# Patient Record
Sex: FEMALE | Race: WHITE | NOT HISPANIC OR LATINO | URBAN - METROPOLITAN AREA
[De-identification: names, ages, dates, MRNs, and addresses within clinical notes are randomized per-mention and may not be internally consistent; named-entity substitution may affect disease eponyms.]

---

## 2024-08-17 ENCOUNTER — OFFICE VISIT (OUTPATIENT)
Age: 34
End: 2024-08-17
Payer: COMMERCIAL

## 2024-08-17 VITALS
SYSTOLIC BLOOD PRESSURE: 129 MMHG | DIASTOLIC BLOOD PRESSURE: 72 MMHG | HEART RATE: 64 BPM | RESPIRATION RATE: 18 BRPM | TEMPERATURE: 98 F | WEIGHT: 211.6 LBS | OXYGEN SATURATION: 96 %
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
## 2024-08-17 DIAGNOSIS — S80.861A INSECT BITE OF RIGHT LOWER LEG, INITIAL ENCOUNTER: Primary | ICD-10-CM

## 2024-08-17 PROCEDURE — G0382 LEV 3 HOSP TYPE B ED VISIT: HCPCS | Performed by: EMERGENCY MEDICINE

## 2024-08-17 PROCEDURE — S9083 URGENT CARE CENTER GLOBAL: HCPCS | Performed by: EMERGENCY MEDICINE

## 2024-08-17 RX ORDER — DEXTROAMPHETAMINE SULFATE, DEXTROAMPHETAMINE SACCHARATE, AMPHETAMINE SULFATE AND AMPHETAMINE ASPARTATE 7.5; 7.5; 7.5; 7.5 MG/1; MG/1; MG/1; MG/1
CAPSULE, EXTENDED RELEASE ORAL EVERY 24 HOURS
COMMUNITY
Start: 2024-06-08

## 2024-08-17 RX ORDER — CEPHALEXIN 500 MG/1
500 CAPSULE ORAL 4 TIMES DAILY
Qty: 20 CAPSULE | Refills: 0 | Status: SHIPPED | OUTPATIENT
Start: 2024-08-17 | End: 2024-08-22

## 2024-08-17 RX ORDER — DIPHENHYDRAMINE HCL 50 MG
50 CAPSULE ORAL EVERY 6 HOURS PRN
Qty: 12 CAPSULE | Refills: 0 | Status: SHIPPED | OUTPATIENT
Start: 2024-08-17

## 2024-08-17 RX ORDER — PREDNISONE 10 MG/1
TABLET ORAL
Qty: 40 TABLET | Refills: 0 | Status: SHIPPED | OUTPATIENT
Start: 2024-08-17

## 2024-08-17 NOTE — PROGRESS NOTES
[facility]        NAME: Betty Stanley is a 34 y.o. female  : 1990    MRN: [id number]  DATE: 2024  TIME: 10:31 PM    Assessment and Plan   Insect bite of right lower leg, initial encounter [S80.861A, W57.XXXA]  1. Insect bite of right lower leg, initial encounter  predniSONE 10 mg tablet    diphenhydrAMINE (BENADRYL) 50 mg capsule    cephalexin (KEFLEX) 500 mg capsule            Patient Instructions     Patient Instructions    Start meds today  Do NOT drive with Benadryl  F/u with PCP In 2-3 days  Proceed to the ER if symptoms get worse      Follow up with PCP in 3-5 days.  Proceed to  ER if symptoms worsen.    Chief Complaint     Chief Complaint   Patient presents with   • Insect Bite     Patient stated a bug bit her right leg yesterday while walking her dog. Leg is itchy, warm and red.          History of Present Illness       33 yo female with cc bug bite to her RLE which happened yesterday while walking her dog..  Pt. C/o redness, itchiness and swelling.        Review of Systems   Review of Systems   Constitutional:  Negative for diaphoresis, fatigue and fever.   HENT:  Negative for congestion, ear pain, nosebleeds and sore throat.    Eyes:  Negative for photophobia, pain, discharge and visual disturbance.   Respiratory:  Negative for cough, choking, chest tightness, shortness of breath and wheezing.    Cardiovascular:  Negative for chest pain and palpitations.   Gastrointestinal:  Negative for abdominal distention, abdominal pain, diarrhea and vomiting.   Genitourinary:  Negative for dysuria, flank pain and frequency.   Musculoskeletal:  Positive for myalgias. Negative for back pain, gait problem and joint swelling.   Skin:  Positive for color change and rash.   Neurological:  Negative for dizziness, syncope and headaches.   Psychiatric/Behavioral:  Negative for behavioral problems and confusion. The patient is not nervous/anxious.    All other systems reviewed and are  negative.        Current Medications       Current Outpatient Medications:   •  ADDERALL XR, 30MG, 30 MG 24 hr capsule, every 24 hours, Disp: , Rfl:   •  cephalexin (KEFLEX) 500 mg capsule, Take 1 capsule (500 mg total) by mouth 4 (four) times a day for 5 days, Disp: 20 capsule, Rfl: 0  •  diphenhydrAMINE (BENADRYL) 50 mg capsule, Take 1 capsule (50 mg total) by mouth every 6 (six) hours as needed for itching for up to 12 doses, Disp: 12 capsule, Rfl: 0  •  predniSONE 10 mg tablet, Take 4 pills x4 days, then 3 pills x4 days, then 2 pills x4 days, and then 1 pill x4 days, Disp: 40 tablet, Rfl: 0    Current Allergies     Allergies as of 08/17/2024 - Reviewed 08/17/2024   Allergen Reaction Noted   • Loteprednol Throat Swelling and Other (See Comments) 07/18/2023            The following portions of the patient's history were reviewed and updated as appropriate: allergies, current medications, past family history, past medical history, past social history, past surgical history and problem list.     No past medical history on file.    No past surgical history on file.    No family history on file.      Medications have been verified.        Objective   /72   Pulse 64   Temp 98 °F (36.7 °C)   Resp 18   Wt 96 kg (211 lb 9.6 oz)   SpO2 96%        Physical Exam     Physical Exam  Vitals reviewed.   Constitutional:       General: She is not in acute distress.     Appearance: She is well-developed. She is not ill-appearing, toxic-appearing or diaphoretic.      Comments: 33 yo female sitting on the exam table with a bug bite to her RLE   HENT:      Head: Normocephalic and atraumatic.      Mouth/Throat:      Pharynx: Oropharynx is clear.   Eyes:      General: No scleral icterus.     Extraocular Movements: Extraocular movements intact.      Pupils: Pupils are equal, round, and reactive to light.   Cardiovascular:      Rate and Rhythm: Normal rate.      Heart sounds: Normal heart sounds.   Pulmonary:      Effort:  Pulmonary effort is normal. No respiratory distress.      Breath sounds: Normal breath sounds. No stridor. No wheezing, rhonchi or rales.   Chest:      Chest wall: No tenderness.   Abdominal:      General: Abdomen is flat. Bowel sounds are normal. There is no distension.      Palpations: Abdomen is soft. There is no shifting dullness, hepatomegaly, splenomegaly or mass.      Tenderness: There is no abdominal tenderness. There is no right CVA tenderness, left CVA tenderness or guarding. Negative signs include Giordano's sign and McBurney's sign.      Hernia: No hernia is present.   Skin:     General: Skin is warm and dry.      Coloration: Skin is not cyanotic, jaundiced, mottled or pale.      Findings: Erythema and rash present.      Comments: RLE:  + erythema to R lower extremity that is macular/papular in nature   Neurological:      General: No focal deficit present.      Mental Status: She is alert and oriented to person, place, and time.   Psychiatric:         Mood and Affect: Mood normal.

## 2024-08-17 NOTE — PATIENT INSTRUCTIONS
Start meds today  Do NOT drive with Benadryl  F/u with PCP In 2-3 days  Proceed to the ER if symptoms get worse